# Patient Record
Sex: FEMALE | Race: WHITE | NOT HISPANIC OR LATINO | Employment: FULL TIME | URBAN - METROPOLITAN AREA
[De-identification: names, ages, dates, MRNs, and addresses within clinical notes are randomized per-mention and may not be internally consistent; named-entity substitution may affect disease eponyms.]

---

## 2018-11-24 ENCOUNTER — HOSPITAL ENCOUNTER (EMERGENCY)
Facility: MEDICAL CENTER | Age: 33
End: 2018-11-24
Attending: EMERGENCY MEDICINE
Payer: MEDICAID

## 2018-11-24 VITALS
HEIGHT: 65 IN | TEMPERATURE: 98 F | RESPIRATION RATE: 20 BRPM | OXYGEN SATURATION: 100 % | BODY MASS INDEX: 27.92 KG/M2 | WEIGHT: 167.55 LBS | SYSTOLIC BLOOD PRESSURE: 127 MMHG | HEART RATE: 98 BPM | DIASTOLIC BLOOD PRESSURE: 74 MMHG

## 2018-11-24 DIAGNOSIS — R11.10 VOMITING AND DIARRHEA: ICD-10-CM

## 2018-11-24 DIAGNOSIS — R10.84 GENERALIZED ABDOMINAL PAIN: ICD-10-CM

## 2018-11-24 DIAGNOSIS — F10.10 ALCOHOL ABUSE: ICD-10-CM

## 2018-11-24 DIAGNOSIS — R19.7 VOMITING AND DIARRHEA: ICD-10-CM

## 2018-11-24 PROCEDURE — 700105 HCHG RX REV CODE 258: Performed by: EMERGENCY MEDICINE

## 2018-11-24 PROCEDURE — 99285 EMERGENCY DEPT VISIT HI MDM: CPT

## 2018-11-24 PROCEDURE — 700111 HCHG RX REV CODE 636 W/ 250 OVERRIDE (IP): Performed by: EMERGENCY MEDICINE

## 2018-11-24 PROCEDURE — 96374 THER/PROPH/DIAG INJ IV PUSH: CPT

## 2018-11-24 RX ORDER — DISULFIRAM 500 MG/1
500 TABLET ORAL EVERY MORNING
Qty: 14 TAB | Refills: 0 | Status: SHIPPED | OUTPATIENT
Start: 2018-11-24

## 2018-11-24 RX ORDER — LEVONORGESTREL / ETHINYL ESTRADIOL AND ETHINYL ESTRADIOL 150-30(84)
1 KIT ORAL DAILY
COMMUNITY

## 2018-11-24 RX ORDER — ONDANSETRON 2 MG/ML
4 INJECTION INTRAMUSCULAR; INTRAVENOUS ONCE
Status: COMPLETED | OUTPATIENT
Start: 2018-11-24 | End: 2018-11-24

## 2018-11-24 RX ORDER — LORAZEPAM 1 MG/1
1 TABLET ORAL 2 TIMES DAILY PRN
COMMUNITY

## 2018-11-24 RX ORDER — TIZANIDINE 4 MG/1
4 TABLET ORAL 2 TIMES DAILY PRN
COMMUNITY

## 2018-11-24 RX ORDER — SODIUM CHLORIDE 9 MG/ML
1000 INJECTION, SOLUTION INTRAVENOUS ONCE
Status: COMPLETED | OUTPATIENT
Start: 2018-11-24 | End: 2018-11-24

## 2018-11-24 RX ORDER — FAMOTIDINE 20 MG/1
20 TABLET, FILM COATED ORAL 2 TIMES DAILY
COMMUNITY

## 2018-11-24 RX ORDER — CARISOPRODOL 350 MG/1
350 TABLET ORAL NIGHTLY PRN
COMMUNITY

## 2018-11-24 RX ORDER — OXYCODONE AND ACETAMINOPHEN 10; 325 MG/1; MG/1
1 TABLET ORAL EVERY 6 HOURS PRN
COMMUNITY

## 2018-11-24 RX ORDER — RANITIDINE 150 MG/1
300 TABLET ORAL DAILY
COMMUNITY

## 2018-11-24 RX ORDER — GABAPENTIN 300 MG/1
900 CAPSULE ORAL 3 TIMES DAILY
COMMUNITY

## 2018-11-24 RX ORDER — NORTRIPTYLINE HYDROCHLORIDE 25 MG/1
50 CAPSULE ORAL NIGHTLY PRN
COMMUNITY

## 2018-11-24 RX ADMIN — SODIUM CHLORIDE 1000 ML: 9 INJECTION, SOLUTION INTRAVENOUS at 07:30

## 2018-11-24 RX ADMIN — ONDANSETRON 4 MG: 2 INJECTION INTRAMUSCULAR; INTRAVENOUS at 07:30

## 2018-11-24 ASSESSMENT — PAIN SCALES - GENERAL
PAINLEVEL_OUTOF10: 4
PAINLEVEL_OUTOF10: 1

## 2018-11-24 ASSESSMENT — ENCOUNTER SYMPTOMS
DIZZINESS: 0
BLOOD IN STOOL: 0
NAUSEA: 1
CHILLS: 0
DIARRHEA: 1
HEADACHES: 0
FEVER: 0
NERVOUS/ANXIOUS: 1
COUGH: 0
VOMITING: 1
TREMORS: 1
SEIZURES: 0
ABDOMINAL PAIN: 1

## 2018-11-24 NOTE — ED TRIAGE NOTES
"Pt C/O diffuse abdominal pain with recurring episodes of N/V since this past Thursday.  \" I can't keep anything down, and I have blood in my vomit.\" in triage pt is tachycardic and tremulous.   "

## 2018-11-24 NOTE — ED NOTES
Iv placed, pt refusing all blood work. Pt medicated as directed by ER md, poc update given to pt. Further orders and dispo pending at this time. No further questions from pt at this time

## 2018-11-24 NOTE — ED PROVIDER NOTES
"ED Provider Note    ED Provider Note    Primary care provider: Pcp Pt States None  Means of arrival: POV  History obtained from: Patient  History limited by: None    CHIEF COMPLAINT  Chief Complaint   Patient presents with   • Rapid Heart Beat   • ETOH Withdrawal       HPI  Myriam Galeano is a 33 y.o. female who presents to the Emergency Department with a chief complaint of nausea and vomiting and stating that she is \"extremely dehydrated\".  States she has not been able to keep anything down since Thursday night.  No one else, that ate Thanksgiving with her, has been sick.  She doubts it is anything she ate for Thanksgiving.  No fever.  No chest pain or shortness of breath.  She has had multiple episodes of nonbloody diarrhea.  She does have a history of heartburn and takes medication, anti-inflammatories for this and is concerned about a possible ulcer.  She denies possibility of pregnancy.  Complains of pain diffusely to her abdomen, but primarily, in the epigastric area and left lower quadrant.  She does admit to alcohol use.  Initially, she is reluctant to tell me how much.  She states \"too much, but I am going to stop\".  When pressed further, patient reports that she drinks approximately a pint of vodka or Flaco alcohol, daily, but \"would not consider herself an alcoholic\".  She has not had anything to drink since Thursday.  Patient denies any urinary symptom although she does complain of some lower mid abdominal pain.  She is reluctant to give a urine sample stating that \"I can guarantee you, I do not have a urinary tract infection\".  She also states that when she drinks \"she drinks vodka and cranberry, and that takes care of any urinary tract infection possibilities\".  She denies any drug use.  She denies any recent past medical history.  She was diagnosed with cervical cancer and had a LEEP procedure in 2008.    REVIEW OF SYSTEMS  Review of Systems   Constitutional: Negative for chills and fever. "   HENT: Negative for congestion.    Respiratory: Negative for cough.    Cardiovascular: Negative for chest pain.   Gastrointestinal: Positive for abdominal pain, diarrhea, nausea and vomiting. Negative for blood in stool.   Genitourinary: Negative for dysuria, frequency and urgency.   Neurological: Positive for tremors. Negative for dizziness, seizures and headaches.   Psychiatric/Behavioral: The patient is nervous/anxious.    All other systems reviewed and are negative.      PAST MEDICAL HISTORY   has a past medical history of Abnormal Pap smear; Depression; Dichorionic diamniotic twin pregnancy in first trimester (6/26/2014); Dysmenorrhea; RAD (reactive airway disease); and Twin pregnancy, twins dichorionic and diamniotic (11/25/2014).    SURGICAL HISTORY   has a past surgical history that includes leep; tonsillectomy; foreign body removal; and primary c section (12/31/2014).    SOCIAL HISTORY  Social History   Substance Use Topics   • Smoking status: Former Smoker     Packs/day: 0.50     Years: 15.00     Types: Cigarettes     Quit date: 5/11/2014   • Smokeless tobacco: Never Used   • Alcohol use Yes      Comment: ETOH abuse      History   Drug Use No       FAMILY HISTORY  Family History   Problem Relation Age of Onset   • Cancer Father         Colon   • Psychiatry Maternal Grandmother         bipolar       CURRENT MEDICATIONS  Home Medications     Reviewed by Emilia Cohen (Pharmacy Tech) on 11/24/18 at 0719  Med List Status: Complete   Medication Last Dose Status   bismuth subsalicylate (PEPTO-BISMOL) 262 MG/15ML Suspension 11/23/2018 Active   carisoprodol (SOMA) 350 MG Tab 11/23/2018 Active   famotidine (PEPCID) 20 MG Tab 11/24/2018 Active   gabapentin (NEURONTIN) 300 MG Cap 11/24/2018 Active   Levonorgest-Eth Estrad 91-Day (SEASONIQUE) 0.15-0.03 &0.01 MG Tab 11/23/2018 Active   LORazepam (ATIVAN) 1 MG Tab 11/24/2018 Active   nortriptyline (PAMELOR) 25 MG Cap 11/21/2018 Active  "  oxyCODONE-acetaminophen (PERCOCET-10)  MG Tab 11/22/2018 Active   raNITidine (ZANTAC) 150 MG Tab UNK Active   tizanidine (ZANAFLEX) 4 MG Tab UNK Active                ALLERGIES  Allergies   Allergen Reactions   • Coconut Oil    • Sulfa Drugs Rash       PHYSICAL EXAM  VITAL SIGNS: /74   Pulse 98   Temp 36.7 °C (98 °F) (Temporal)   Resp 20   Ht 1.651 m (5' 5\")   Wt 76 kg (167 lb 8.8 oz)   SpO2 100%   BMI 27.88 kg/m²   Vitals reviewed.  Constitutional: Patient is oriented to person, place, and time. Appears well-developed and well-nourished. Mild distress.    Head: Normocephalic and atraumatic.   Ears: Normal external ears bilaterally.   Mouth/Throat: Oropharynx is clear and moist  Eyes: Conjunctivae are normal. Pupils are equal, round, and reactive to light.   Neck: Normal range of motion. Neck supple.  Cardiovascular: Tachycardia, regular rhythm and normal heart sounds. Normal peripheral pulses.  Pulmonary/Chest: Effort normal and breath sounds normal. No respiratory distress, no wheezes, rhonchi, or rales.   Abdominal: Soft. Bowel sounds are normal. There is mild diffuse tenderness, worst in the epigastric and lower abdomen.  No rebound or guarding, or peritoneal signs. No CVA tenderness.  Musculoskeletal: No edema and no tenderness.   Neurological: No focal deficits.   Skin: Skin is warm and dry. No erythema. No pallor.   Psychiatric: Patient has a normal mood and affect.     COURSE & MEDICAL DECISION MAKING  Pertinent Labs & Imaging studies reviewed. (See chart for details)    Obtained and reviewed past medical records.  Patient's last encounter was in April 2016, she was seen for a seizure.    6:51 AM - Patient seen and examined at bedside. Patient refused EKG refused IV start.  I have spoken to the nurse at the bedside.  She is agreeable to IV.  Ordered labs.  She still refusing EKG and she will not provide a urine sample.    Per nurse, patient is agreeable to IV start.  However, she did not " want any labs drawn.  Currently canceled.    8:48 AM patient reevaluated at the bedside.  She is feeling markedly better.  Heart rate down to the low 100s, high 90s.  She is able to sip fluids.  She declined to having any lab evaluation.  She states she is on Medicaid and does not want to incur the cost.  At this time, she wants for no further intervention.  She understands, we could be missing diagnoses without this information.  She understands.  She is given strict return precautions.  Anticipate discharge to home after IV fluids infused.    9:15 AM prior to discharge, she had requested a prescription for Antabuse.  Which I think is appropriate at this time.  It has been more than 12 hours since she last had anything to drink.  She stating, she did not realize, that her alcohol intake was a problem but she is terminated, and to get it under control now.  Again, her heart rate improved after IV fluids.  Vital signs normal on discharge.  Patient appears well.  She will be discharged home in stable condition.    FINAL IMPRESSION  1. Vomiting and diarrhea    2. Generalized abdominal pain    3. Alcohol abuse

## 2018-11-24 NOTE — ED NOTES
Med Rec completed per patient  Allergies reviewed  No ORAL antibiotics in last 30 days    Verified Narcotics with Narc check

## 2019-01-08 ENCOUNTER — TELEPHONE (OUTPATIENT)
Dept: SCHEDULING | Facility: IMAGING CENTER | Age: 34
End: 2019-01-08

## 2019-04-03 ENCOUNTER — TELEPHONE (OUTPATIENT)
Dept: HEALTH INFORMATION MANAGEMENT | Facility: OTHER | Age: 34
End: 2019-04-03

## 2021-02-19 ENCOUNTER — HOSPITAL ENCOUNTER (EMERGENCY)
Dept: HOSPITAL 8 - ED | Age: 36
Discharge: HOME | End: 2021-02-19
Payer: COMMERCIAL

## 2021-02-19 VITALS — SYSTOLIC BLOOD PRESSURE: 149 MMHG | DIASTOLIC BLOOD PRESSURE: 81 MMHG

## 2021-02-19 VITALS — HEIGHT: 65 IN | BODY MASS INDEX: 33.2 KG/M2 | WEIGHT: 199.3 LBS

## 2021-02-19 DIAGNOSIS — S09.90XA: Primary | ICD-10-CM
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
## 2021-02-19 DIAGNOSIS — T42.6X5A: ICD-10-CM
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
## 2021-02-19 DIAGNOSIS — R00.0: ICD-10-CM
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
## 2021-02-19 LAB
ALBUMIN SERPL-MCNC: 3.7 G/DL (ref 3.4–5)
ALP SERPL-CCNC: 79 U/L (ref 45–117)
ALT SERPL-CCNC: 69 U/L (ref 12–78)
ANION GAP SERPL CALC-SCNC: 10 MMOL/L (ref 5–15)
BASOPHILS # BLD AUTO: 0.1 X10^3/UL (ref 0–0.1)
BASOPHILS NFR BLD AUTO: 1 % (ref 0–1)
BILIRUB SERPL-MCNC: 0.6 MG/DL (ref 0.2–1)
CALCIUM SERPL-MCNC: 9.1 MG/DL (ref 8.5–10.1)
CHLORIDE SERPL-SCNC: 112 MMOL/L (ref 98–107)
CREAT SERPL-MCNC: 0.86 MG/DL (ref 0.55–1.02)
EOSINOPHIL # BLD AUTO: 0.1 X10^3/UL (ref 0–0.4)
EOSINOPHIL NFR BLD AUTO: 1 % (ref 1–7)
ERYTHROCYTE [DISTWIDTH] IN BLOOD BY AUTOMATED COUNT: 13.6 % (ref 9.6–15.2)
LYMPHOCYTES # BLD AUTO: 2.1 X10^3/UL (ref 1–3.4)
LYMPHOCYTES NFR BLD AUTO: 30 % (ref 22–44)
MCH RBC QN AUTO: 31.6 PG (ref 27–34.8)
MCHC RBC AUTO-ENTMCNC: 34.2 G/DL (ref 32.4–35.8)
MD: NO
MICROSCOPIC: (no result)
MONOCYTES # BLD AUTO: 0.5 X10^3/UL (ref 0.2–0.8)
MONOCYTES NFR BLD AUTO: 7 % (ref 2–9)
NEUTROPHILS # BLD AUTO: 4.3 X10^3/UL (ref 1.8–6.8)
NEUTROPHILS NFR BLD AUTO: 61 % (ref 42–75)
PLATELET # BLD AUTO: 270 X10^3/UL (ref 130–400)
PMV BLD AUTO: 8.5 FL (ref 7.4–10.4)
PROT SERPL-MCNC: 7.6 G/DL (ref 6.4–8.2)
RBC # BLD AUTO: 4.61 X10^6/UL (ref 3.82–5.3)

## 2021-02-19 PROCEDURE — 87086 URINE CULTURE/COLONY COUNT: CPT

## 2021-02-19 PROCEDURE — 36415 COLL VENOUS BLD VENIPUNCTURE: CPT

## 2021-02-19 PROCEDURE — 96361 HYDRATE IV INFUSION ADD-ON: CPT

## 2021-02-19 PROCEDURE — 84443 ASSAY THYROID STIM HORMONE: CPT

## 2021-02-19 PROCEDURE — 80053 COMPREHEN METABOLIC PANEL: CPT

## 2021-02-19 PROCEDURE — 93005 ELECTROCARDIOGRAM TRACING: CPT

## 2021-02-19 PROCEDURE — 83735 ASSAY OF MAGNESIUM: CPT

## 2021-02-19 PROCEDURE — 82306 VITAMIN D 25 HYDROXY: CPT

## 2021-02-19 PROCEDURE — 70450 CT HEAD/BRAIN W/O DYE: CPT

## 2021-02-19 PROCEDURE — 81001 URINALYSIS AUTO W/SCOPE: CPT

## 2021-02-19 PROCEDURE — 96374 THER/PROPH/DIAG INJ IV PUSH: CPT

## 2021-02-19 PROCEDURE — 99285 EMERGENCY DEPT VISIT HI MDM: CPT

## 2021-02-19 PROCEDURE — 85025 COMPLETE CBC W/AUTO DIFF WBC: CPT

## 2021-02-19 NOTE — NUR
PT C/O PIV PAIN. SITE ASSESSED AND FLUSHED. POSTIVE BLOOOD RETURN. NO S/S OF 
INFILTRATIION. FLUIDS STOPPED AND ARM ELEVATED. WILL REASSESS IN 15 MIN.